# Patient Record
Sex: FEMALE | Race: WHITE | NOT HISPANIC OR LATINO | URBAN - NONMETROPOLITAN AREA
[De-identification: names, ages, dates, MRNs, and addresses within clinical notes are randomized per-mention and may not be internally consistent; named-entity substitution may affect disease eponyms.]

---

## 2020-07-21 ENCOUNTER — OFFICE VISIT (OUTPATIENT)
Dept: URBAN - NONMETROPOLITAN AREA CLINIC 2 | Facility: CLINIC | Age: 60
End: 2020-07-21
Payer: MEDICARE

## 2020-07-21 ENCOUNTER — WEB ENCOUNTER (OUTPATIENT)
Dept: URBAN - NONMETROPOLITAN AREA CLINIC 2 | Facility: CLINIC | Age: 60
End: 2020-07-21

## 2020-07-21 DIAGNOSIS — R14.0 BLOATING/GAS PAIN: ICD-10-CM

## 2020-07-21 DIAGNOSIS — K55.1 SUPERIOR MESENTERIC ARTERY SYNDROME: ICD-10-CM

## 2020-07-21 DIAGNOSIS — K59.09 COLONIC CONSTIPATION: ICD-10-CM

## 2020-07-21 PROCEDURE — 3017F COLORECTAL CA SCREEN DOC REV: CPT | Performed by: NURSE PRACTITIONER

## 2020-07-21 PROCEDURE — 99213 OFFICE O/P EST LOW 20 MIN: CPT | Performed by: NURSE PRACTITIONER

## 2020-07-21 PROCEDURE — G8427 DOCREV CUR MEDS BY ELIG CLIN: HCPCS | Performed by: NURSE PRACTITIONER

## 2020-07-21 PROCEDURE — 1036F TOBACCO NON-USER: CPT | Performed by: NURSE PRACTITIONER

## 2020-07-21 RX ORDER — BUTALBITAL, ACETAMINOPHEN, AND CAFFEINE 50; 300; 40 MG/1; MG/1; MG/1
CAPSULE ORAL
Qty: 0 | Refills: 0 | Status: ACTIVE | COMMUNITY
Start: 1900-01-01

## 2020-07-21 RX ORDER — PANTOPRAZOLE SODIUM 40 MG/1
TAKE 1 TABLET (40 MG) BY ORAL ROUTE ONCE DAILY TABLET, DELAYED RELEASE ORAL 1
Qty: 90 | Refills: 3 | Status: ACTIVE | COMMUNITY
Start: 2020-02-11 | End: 2021-02-05

## 2020-07-21 NOTE — HPI-TODAY'S VISIT:
7/21/2020 Ms. Lake presents for follow up of history of SMA syndrome, abdominal pain and constipation. Since her last visit she did see vascular surgery with normal doppler. She continues to have intermittent left chest wall pain and LUQ abdominal pain assocaited with eating and a BM. She is managing this with diet. Protonix and IG gilbert caused significant GI distress with pain and bloating. She does not eat gluten or dairy, she only eats vegetable and legumes. She is not taking anything for her bowels. She was told 3 years ago at [facility] that her GI tract would likely need surgical intervention with in three years due to poor blood flow from her multiple surgeries and that she may require a small bowel transplant. She has not had an EGD/Colonoscopy since. She does not want to pursue this with COVID. She is thinking of trying the hydrotherapy with IV hydration. MB   2/11/2020 Ms. Lake presents for evalution of left sided chest wall pain and substernal pain with nausea. The symptoms began acutely last month and have been progressive. Her only relief is positional with laying on her left side on a pillow. The pain may improve with eating but she develops subsequent nausea. She denies any SOB or acute chest pain with any radiation. She denies reflux or heartburn. She went to an urgent care yesterday s/p steroid injection with possible costochondritis with no significant change in her pain. She is taking NSAIDs with no relief. She follows with [place] and [facility] with history of SMA syndrome s/p transpotision surgery. She follows with Dr. Neves locally. MB   11/26/2019 Ms. Lake present today for f/u of her SMA syndrome with gas, bloating, and consitpation.  She is actually doing well today.  She has been doing PT 3 times a week.  Her Graves disease is under control, and this is the best that she has felt in years.  She has not taken dulcolax or pedialax since our last visit.  She has a small bowel movement every day and a good bowel movement about every 3 days.  She has not done well with Xifaxan in the past.  She did not think that the IB guard helped either.  She has her system fairly stable at this point and she is doing well.  9/18/2018 The patient is a 58-year-old female with a long, complicated history.  She was diagnosed with SMA syndrome.  She went through a very extensive workup and has had multiple surgeries.  She initially had 1 of the first SMA transposition surgeries done at [place].  Unfortunately, we do not have any of these records today.  Her main issue today is that she has been having problems with bloating, gas, and constipation.  She does not take any medications.  She does not take any pain medications.  She has very limited in her diet.  She has tried eliminating certain foods from her diet.  She has tried probiotics.  She has been evaluated at [place] and at [facility] as well.  Her last colonoscopy was done at [facility] and she was found to have a right colon polyp.  She had a very tortuous colon at that time.  On her EGD, she was described to have an abnormal shape to the stomach, with shelf looking like it might be on top of the diaphragm or the liver.  The stomach appeared to be in the chest a bit.  Everything on the mucosa appeared normal.  she continues to have daily abdominal pain with bloating.  She also has alternating constipation with diarrhea.

## 2020-09-22 ENCOUNTER — OFFICE VISIT (OUTPATIENT)
Dept: URBAN - NONMETROPOLITAN AREA CLINIC 2 | Facility: CLINIC | Age: 60
End: 2020-09-22

## 2020-09-29 ENCOUNTER — TELEPHONE ENCOUNTER (OUTPATIENT)
Dept: URBAN - NONMETROPOLITAN AREA CLINIC 2 | Facility: CLINIC | Age: 60
End: 2020-09-29

## 2020-10-27 ENCOUNTER — LAB OUTSIDE AN ENCOUNTER (OUTPATIENT)
Dept: URBAN - NONMETROPOLITAN AREA CLINIC 2 | Facility: CLINIC | Age: 60
End: 2020-10-27

## 2020-10-27 ENCOUNTER — WEB ENCOUNTER (OUTPATIENT)
Dept: URBAN - NONMETROPOLITAN AREA CLINIC 2 | Facility: CLINIC | Age: 60
End: 2020-10-27

## 2020-10-27 ENCOUNTER — OFFICE VISIT (OUTPATIENT)
Dept: URBAN - NONMETROPOLITAN AREA CLINIC 2 | Facility: CLINIC | Age: 60
End: 2020-10-27
Payer: MEDICARE

## 2020-10-27 VITALS
SYSTOLIC BLOOD PRESSURE: 149 MMHG | HEART RATE: 67 BPM | HEIGHT: 61 IN | WEIGHT: 97 LBS | DIASTOLIC BLOOD PRESSURE: 93 MMHG | TEMPERATURE: 97.8 F | BODY MASS INDEX: 18.31 KG/M2

## 2020-10-27 DIAGNOSIS — R14.0 BLOATING/GAS PAIN: ICD-10-CM

## 2020-10-27 DIAGNOSIS — K55.1 SUPERIOR MESENTERIC ARTERY SYNDROME: ICD-10-CM

## 2020-10-27 DIAGNOSIS — K59.09 COLONIC CONSTIPATION: ICD-10-CM

## 2020-10-27 PROCEDURE — 1036F TOBACCO NON-USER: CPT | Performed by: NURSE PRACTITIONER

## 2020-10-27 PROCEDURE — 99214 OFFICE O/P EST MOD 30 MIN: CPT | Performed by: NURSE PRACTITIONER

## 2020-10-27 PROCEDURE — G8420 CALC BMI NORM PARAMETERS: HCPCS | Performed by: NURSE PRACTITIONER

## 2020-10-27 PROCEDURE — G8427 DOCREV CUR MEDS BY ELIG CLIN: HCPCS | Performed by: NURSE PRACTITIONER

## 2020-10-27 RX ORDER — PANTOPRAZOLE SODIUM 40 MG/1
TAKE 1 TABLET (40 MG) BY ORAL ROUTE ONCE DAILY TABLET, DELAYED RELEASE ORAL 1
Qty: 90 | Refills: 3 | Status: ACTIVE | COMMUNITY
Start: 2020-02-11 | End: 2021-02-05

## 2020-10-27 RX ORDER — BUTALBITAL, ACETAMINOPHEN, AND CAFFEINE 50; 300; 40 MG/1; MG/1; MG/1
CAPSULE ORAL
Qty: 0 | Refills: 0 | Status: ACTIVE | COMMUNITY
Start: 1900-01-01

## 2020-10-27 NOTE — HPI-TODAY'S VISIT:
9/18/2018 The patient is a 58-year-old female with a long, complicated history.  She was diagnosed with SMA syndrome.  She went through a very extensive workup and has had multiple surgeries.  She initially had 1 of the first SMA transposition surgeries done at [place].  Unfortunately, we do not have any of these records today.  Her main issue today is that she has been having problems with bloating, gas, and constipation.  She does not take any medications.  She does not take any pain medications.  She has very limited in her diet.  She has tried eliminating certain foods from her diet.  She has tried probiotics.  She has been evaluated at [place] and at [facility] as well.  Her last colonoscopy was done at [facility] and she was found to have a right colon polyp.  She had a very tortuous colon at that time.  On her EGD, she was described to have an abnormal shape to the stomach, with shelf looking like it might be on top of the diaphragm or the liver.  The stomach appeared to be in the chest a bit.  Everything on the mucosa appeared normal.  she continues to have daily abdominal pain with bloating.  She also has alternating constipation with diarrhea.  11/26/2019 Ms. Lake present today for f/u of her SMA syndrome with gas, bloating, and consitpation.  She is actually doing well today.  She has been doing PT 3 times a week.  Her Graves disease is under control, and this is the best that she has felt in years.  She has not taken dulcolax or pedialax since our last visit.  She has a small bowel movement every day and a good bowel movement about every 3 days.  She has not done well with Xifaxan in the past.  She did not think that the IB guard helped either.  She has her system fairly stable at this point and she is doing well. 2/11/2020 Ms. Lake presents for evalution of left sided chest wall pain and substernal pain with nausea. The symptoms began acutely last month and have been progressive. Her only relief is positional with laying on her left side on a pillow. The pain may improve with eating but she develops subsequent nausea. She denies any SOB or acute chest pain with any radiation. She denies reflux or heartburn. She went to an urgent care yesterday s/p steroid injection with possible costochondritis with no significant change in her pain. She is taking NSAIDs with no relief. She follows with [place] and [facility] with history of SMA syndrome s/p transpotision surgery. She follows with Dr. Neves locally. MB 7/21/2020 Ms. Lake presents for follow up of history of SMA syndrome, abdominal pain and constipation. Since her last visit she did see vascular surgery with normal doppler. She continues to have intermittent left chest wall pain and LUQ abdominal pain assocaited with eating and a BM. She is managing this with diet. Protonix and IG gilbert caused significant GI distress with pain and bloating. She does not eat gluten or dairy, she only eats vegetable and legumes. She is not taking anything for her bowels. She was told 3 years ago at [facility] that her GI tract would likely need surgical intervention with in three years due to poor blood flow from her multiple surgeries and that she may require a small bowel transplant. She has not had an EGD/Colonoscopy since. She does not want to pursue this with COVID. She is thinking of trying the hydrotherapy with IV hydration. MB 10/27/2020 Geri presents for follow-up of SMA syndrome, abdominal pain, and weight loss.  Since her last visit she did undergo evaluation with vascular surgery at [place].  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  She continues to struggle with postprandial abdominal pain and weight loss.  She got down to 92 pounds but is back up to 97 with a more liquid diet.  She is eating a high calorie low residue diet.  Her vascular surgery team is considering a repeat surgery given her vascular changes but she does need a repeat upper endoscopy to evaluate her duodenum and her anatomy.  She continues to struggle with her symptoms but does feel like she has figured out what she can eat.  Today she is ready to proceed with repeat EGD.  MB

## 2020-10-28 ENCOUNTER — CLAIMS CREATED FROM THE CLAIM WINDOW (OUTPATIENT)
Dept: URBAN - METROPOLITAN AREA CLINIC 4 | Facility: CLINIC | Age: 60
End: 2020-10-28
Payer: MEDICARE

## 2020-10-28 ENCOUNTER — OFFICE VISIT (OUTPATIENT)
Dept: URBAN - NONMETROPOLITAN AREA SURGERY CENTER 1 | Facility: SURGERY CENTER | Age: 60
End: 2020-10-28
Payer: MEDICARE

## 2020-10-28 DIAGNOSIS — K31.89 OTHER DISEASES OF STOMACH AND DUODENUM: ICD-10-CM

## 2020-10-28 DIAGNOSIS — K31.89 ACQUIRED DEFORMITY OF DUODENUM: ICD-10-CM

## 2020-10-28 DIAGNOSIS — K21.9 ACID REFLUX: ICD-10-CM

## 2020-10-28 DIAGNOSIS — K29.60 OTHER GASTRITIS WITHOUT BLEEDING: ICD-10-CM

## 2020-10-28 DIAGNOSIS — K21.0 GASTRO-ESOPHAGEAL REFLUX DISEASE WITH ESOPHAGITIS: ICD-10-CM

## 2020-10-28 PROCEDURE — 88305 TISSUE EXAM BY PATHOLOGIST: CPT | Performed by: PATHOLOGY

## 2020-10-28 PROCEDURE — 88312 SPECIAL STAINS GROUP 1: CPT | Performed by: PATHOLOGY

## 2020-10-28 PROCEDURE — 88342 IMHCHEM/IMCYTCHM 1ST ANTB: CPT | Performed by: PATHOLOGY

## 2020-10-28 PROCEDURE — G8907 PT DOC NO EVENTS ON DISCHARG: HCPCS | Performed by: INTERNAL MEDICINE

## 2020-10-28 PROCEDURE — 43239 EGD BIOPSY SINGLE/MULTIPLE: CPT | Performed by: INTERNAL MEDICINE

## 2020-11-17 ENCOUNTER — OFFICE VISIT (OUTPATIENT)
Dept: URBAN - NONMETROPOLITAN AREA CLINIC 2 | Facility: CLINIC | Age: 60
End: 2020-11-17
Payer: MEDICARE

## 2020-11-17 ENCOUNTER — LAB OUTSIDE AN ENCOUNTER (OUTPATIENT)
Dept: URBAN - NONMETROPOLITAN AREA CLINIC 2 | Facility: CLINIC | Age: 60
End: 2020-11-17

## 2020-11-17 VITALS
HEIGHT: 61 IN | DIASTOLIC BLOOD PRESSURE: 74 MMHG | TEMPERATURE: 97.5 F | SYSTOLIC BLOOD PRESSURE: 120 MMHG | BODY MASS INDEX: 17.94 KG/M2 | HEART RATE: 69 BPM | WEIGHT: 95 LBS

## 2020-11-17 DIAGNOSIS — K59.09 COLONIC CONSTIPATION: ICD-10-CM

## 2020-11-17 DIAGNOSIS — K55.1 SUPERIOR MESENTERIC ARTERY SYNDROME: ICD-10-CM

## 2020-11-17 DIAGNOSIS — R14.0 BLOATING/GAS PAIN: ICD-10-CM

## 2020-11-17 PROCEDURE — 1036F TOBACCO NON-USER: CPT | Performed by: NURSE PRACTITIONER

## 2020-11-17 PROCEDURE — 99213 OFFICE O/P EST LOW 20 MIN: CPT | Performed by: NURSE PRACTITIONER

## 2020-11-17 PROCEDURE — 3017F COLORECTAL CA SCREEN DOC REV: CPT | Performed by: NURSE PRACTITIONER

## 2020-11-17 PROCEDURE — G8427 DOCREV CUR MEDS BY ELIG CLIN: HCPCS | Performed by: NURSE PRACTITIONER

## 2020-11-17 RX ORDER — BUTALBITAL, ACETAMINOPHEN, AND CAFFEINE 50; 300; 40 MG/1; MG/1; MG/1
CAPSULE ORAL
Qty: 0 | Refills: 0 | Status: ACTIVE | COMMUNITY
Start: 1900-01-01

## 2020-11-17 RX ORDER — PANTOPRAZOLE SODIUM 40 MG/1
TAKE 1 TABLET (40 MG) BY ORAL ROUTE ONCE DAILY TABLET, DELAYED RELEASE ORAL 1
Qty: 90 | Refills: 3 | Status: ACTIVE | COMMUNITY
Start: 2020-02-11 | End: 2021-02-05

## 2020-11-17 NOTE — HPI-TODAY'S VISIT:
9/18/2018 The patient is a 58-year-old female with a long, complicated history.  She was diagnosed with SMA syndrome.  She went through a very extensive workup and has had multiple surgeries.  She initially had 1 of the first SMA transposition surgeries done at [place].  Unfortunately, we do not have any of these records today.  Her main issue today is that she has been having problems with bloating, gas, and constipation.  She does not take any medications.  She does not take any pain medications.  She has very limited in her diet.  She has tried eliminating certain foods from her diet.  She has tried probiotics.  She has been evaluated at [place] and at [facility] as well.  Her last colonoscopy was done at [facility] and she was found to have a right colon polyp.  She had a very tortuous colon at that time.  On her EGD, she was described to have an abnormal shape to the stomach, with shelf looking like it might be on top of the diaphragm or the liver.  The stomach appeared to be in the chest a bit.  Everything on the mucosa appeared normal.  she continues to have daily abdominal pain with bloating.  She also has alternating constipation with diarrhea.  11/26/2019 Ms. Lake present today for f/u of her SMA syndrome with gas, bloating, and consitpation.  She is actually doing well today.  She has been doing PT 3 times a week.  Her Graves disease is under control, and this is the best that she has felt in years.  She has not taken dulcolax or pedialax since our last visit.  She has a small bowel movement every day and a good bowel movement about every 3 days.  She has not done well with Xifaxan in the past.  She did not think that the IB guard helped either.  She has her system fairly stable at this point and she is doing well. 2/11/2020 Ms. Lake presents for evalution of left sided chest wall pain and substernal pain with nausea. The symptoms began acutely last month and have been progressive. Her only relief is positional with laying on her left side on a pillow. The pain may improve with eating but she develops subsequent nausea. She denies any SOB or acute chest pain with any radiation. She denies reflux or heartburn. She went to an urgent care yesterday s/p steroid injection with possible costochondritis with no significant change in her pain. She is taking NSAIDs with no relief. She follows with [place] and [facility] with history of SMA syndrome s/p transpotision surgery. She follows with Dr. Neves locally. MB 7/21/2020 Ms. Lake presents for follow up of history of SMA syndrome, abdominal pain and constipation. Since her last visit she did see vascular surgery with normal doppler. She continues to have intermittent left chest wall pain and LUQ abdominal pain assocaited with eating and a BM. She is managing this with diet. Protonix and IG gilbert caused significant GI distress with pain and bloating. She does not eat gluten or dairy, she only eats vegetable and legumes. She is not taking anything for her bowels. She was told 3 years ago at [facility] that her GI tract would likely need surgical intervention with in three years due to poor blood flow from her multiple surgeries and that she may require a small bowel transplant. She has not had an EGD/Colonoscopy since. She does not want to pursue this with COVID. She is thinking of trying the hydrotherapy with IV hydration. MB 10/27/2020 Geri presents for follow-up of SMA syndrome, abdominal pain, and weight loss.  Since her last visit she did undergo evaluation with vascular surgery at [place].  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  She continues to struggle with postprandial abdominal pain and weight loss.  She got down to 92 pounds but is back up to 97 with a more liquid diet.  She is eating a high calorie low residue diet.  Her vascular surgery team is considering a repeat surgery given her vascular changes but she does need a repeat upper endoscopy to evaluate her duodenum and her anatomy.  She continues to struggle with her symptoms but does feel like she has figured out what she can eat.  Today she is ready to proceed with repeat EGD.  MB  11/17/2020 Geri presents for endoscopy follow-up. Dr. Garcia was able to get to the second portion of her duodenum with no evidence of ischemia on the mucosa. She took multiple biopsies in the second portion along with gastric and esophageal biopsies. She has no evidence of ischemia throughout her upper GI tract. She continues to struggle on and off with abdominal pain. Recently it has been more in her right lower quadrant. Her constipation has been fairly well controlled nutritionally. She is not taking anything regularly for this. She does agree that gastropathy plays a role in her symptoms. She would like to have a colonoscopy prior to sitting back down with Dr. Shaw and the vascular team at [place] early in 2021. Today we had a discussion regarding risks and benefits and she would like to pursue colonoscopy. Her last was in May 2017 done at [facility] with a right colon polyp near the cecum along with a tortuous colon requiring a pediatric scope.  We do not have the pathology report from this.  Today she is doing fairly well otherwise but would like to proceed with repeat colonoscopy for further evaluation of her discomfort.  MB

## 2020-12-10 ENCOUNTER — OFFICE VISIT (OUTPATIENT)
Dept: URBAN - METROPOLITAN AREA MEDICAL CENTER 1 | Facility: MEDICAL CENTER | Age: 60
End: 2020-12-10
Payer: MEDICARE

## 2020-12-10 DIAGNOSIS — R93.3 ABN FINDINGS-GI TRACT: ICD-10-CM

## 2020-12-10 DIAGNOSIS — R10.84 ABDOMINAL CRAMPING, GENERALIZED: ICD-10-CM

## 2020-12-10 PROCEDURE — G9937 DIG OR SURV COLSCO: HCPCS | Performed by: INTERNAL MEDICINE

## 2020-12-10 PROCEDURE — 45380 COLONOSCOPY AND BIOPSY: CPT | Performed by: INTERNAL MEDICINE

## 2021-05-17 ENCOUNTER — OFFICE VISIT (OUTPATIENT)
Dept: URBAN - NONMETROPOLITAN AREA CLINIC 13 | Facility: CLINIC | Age: 61
End: 2021-05-17
Payer: MEDICARE

## 2021-05-17 VITALS
HEIGHT: 61 IN | DIASTOLIC BLOOD PRESSURE: 81 MMHG | SYSTOLIC BLOOD PRESSURE: 119 MMHG | TEMPERATURE: 96.9 F | HEART RATE: 71 BPM | BODY MASS INDEX: 18.5 KG/M2 | WEIGHT: 98 LBS

## 2021-05-17 DIAGNOSIS — R10.9 LEFT FLANK PAIN: ICD-10-CM

## 2021-05-17 DIAGNOSIS — K59.09 COLONIC CONSTIPATION: ICD-10-CM

## 2021-05-17 DIAGNOSIS — K55.1 SUPERIOR MESENTERIC ARTERY SYNDROME: ICD-10-CM

## 2021-05-17 DIAGNOSIS — R14.0 BLOATING/GAS PAIN: ICD-10-CM

## 2021-05-17 PROCEDURE — 99214 OFFICE O/P EST MOD 30 MIN: CPT | Performed by: INTERNAL MEDICINE

## 2021-05-17 RX ORDER — BUTALBITAL, ACETAMINOPHEN, AND CAFFEINE 50; 300; 40 MG/1; MG/1; MG/1
CAPSULE ORAL
Qty: 0 | Refills: 0 | Status: ACTIVE | COMMUNITY
Start: 1900-01-01

## 2021-05-17 NOTE — HPI-TODAY'S VISIT:
9/18/2018 The patient is a 58-year-old female with a long, complicated history.  She was diagnosed with SMA syndrome.  She went through a very extensive workup and has had multiple surgeries.  She initially had 1 of the first SMA transposition surgeries done at [place].  Unfortunately, we do not have any of these records today.  Her main issue today is that she has been having problems with bloating, gas, and constipation.  She does not take any medications.  She does not take any pain medications.  She has very limited in her diet.  She has tried eliminating certain foods from her diet.  She has tried probiotics.  She has been evaluated at [place] and at [facility] as well.  Her last colonoscopy was done at [facility] and she was found to have a right colon polyp.  She had a very tortuous colon at that time.  On her EGD, she was described to have an abnormal shape to the stomach, with shelf looking like it might be on top of the diaphragm or the liver.  The stomach appeared to be in the chest a bit.  Everything on the mucosa appeared normal.  she continues to have daily abdominal pain with bloating.  She also has alternating constipation with diarrhea.  11/26/2019 Ms. Lake present today for f/u of her SMA syndrome with gas, bloating, and consitpation.  She is actually doing well today.  She has been doing PT 3 times a week.  Her Graves disease is under control, and this is the best that she has felt in years.  She has not taken dulcolax or pedialax since our last visit.  She has a small bowel movement every day and a good bowel movement about every 3 days.  She has not done well with Xifaxan in the past.  She did not think that the IB guard helped either.  She has her system fairly stable at this point and she is doing well. 2/11/2020 Ms. Lake presents for evalution of left sided chest wall pain and substernal pain with nausea. The symptoms began acutely last month and have been progressive. Her only relief is positional with laying on her left side on a pillow. The pain may improve with eating but she develops subsequent nausea. She denies any SOB or acute chest pain with any radiation. She denies reflux or heartburn. She went to an urgent care yesterday s/p steroid injection with possible costochondritis with no significant change in her pain. She is taking NSAIDs with no relief. She follows with [place] and [facility] with history of SMA syndrome s/p transpotision surgery. She follows with Dr. Neves locally. MB 7/21/2020 Ms. Lake presents for follow up of history of SMA syndrome, abdominal pain and constipation. Since her last visit she did see vascular surgery with normal doppler. She continues to have intermittent left chest wall pain and LUQ abdominal pain assocaited with eating and a BM. She is managing this with diet. Protonix and IG gilbert caused significant GI distress with pain and bloating. She does not eat gluten or dairy, she only eats vegetable and legumes. She is not taking anything for her bowels. She was told 3 years ago at [facility] that her GI tract would likely need surgical intervention with in three years due to poor blood flow from her multiple surgeries and that she may require a small bowel transplant. She has not had an EGD/Colonoscopy since. She does not want to pursue this with COVID. She is thinking of trying the hydrotherapy with IV hydration. MB 10/27/2020 Geri presents for follow-up of SMA syndrome, abdominal pain, and weight loss.  Since her last visit she did undergo evaluation with vascular surgery at [place].  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  She continues to struggle with postprandial abdominal pain and weight loss.  She got down to 92 pounds but is back up to 97 with a more liquid diet.  She is eating a high calorie low residue diet.  Her vascular surgery team is considering a repeat surgery given her vascular changes but she does need a repeat upper endoscopy to evaluate her duodenum and her anatomy.  She continues to struggle with her symptoms but does feel like she has figured out what she can eat.  Today she is ready to proceed with repeat EGD.  MB  11/17/2020 Geri presents for endoscopy follow-up. Dr. Garcia was able to get to the second portion of her duodenum with no evidence of ischemia on the mucosa. She took multiple biopsies in the second portion along with gastric and esophageal biopsies. She has no evidence of ischemia throughout her upper GI tract. She continues to struggle on and off with abdominal pain. Recently it has been more in her right lower quadrant. Her constipation has been fairly well controlled nutritionally. She is not taking anything regularly for this. She does agree that gastropathy plays a role in her symptoms. She would like to have a colonoscopy prior to sitting back down with Dr. Shaw and the vascular team at [place] early in 2021. Today we had a discussion regarding risks and benefits and she would like to pursue colonoscopy. Her last was in May 2017 done at [facility] with a right colon polyp near the cecum along with a tortuous colon requiring a pediatric scope.  We do not have the pathology report from this.  Today she is doing fairly well otherwise but would like to proceed with repeat colonoscopy for further evaluation of her discomfort.  MB  5/17/2021 Mrs. Lake is presents for follow-up of history of SMA syndrome with a complex history as described above.  Since her last visit she underwent colonoscopy in November 2020 that was normal.  She has been doing well on no medications for her GI tract however 3 weeks ago she developed left flank pain.  She has tried ibuprofen 3 times for the discomfort with some improvement.  This has given her some mild heartburn.  She has a bowel movement on a daily basis by eating beans.  She does feel like she evacuates normally.  The pain is severe, and radiates up and down her entire body.  She called Dr. Hammonds her vascular surgeon at [place] who suggested she contact her PCP and her gastroenterology team.  She has a complicated history with medications and does not tolerate most any prescription medication.  Her colonoscopy was normal in December as of described above.  Prior to that she had an EGD in October which shows mild esophagitis, and mild gastritis.  She does agree to try a low-dose of Nexium over-the-counter branded as this has helped in the past.  She does agree to try a low-dose of MiraLAX to see if we can improve evacuation.  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  She has not seen a urologist for this, but is interested in with meeting one at [place].  Today she has multiple complaints and an extremely complicated history.  MB

## 2021-08-27 ENCOUNTER — WEB ENCOUNTER (OUTPATIENT)
Dept: URBAN - NONMETROPOLITAN AREA CLINIC 13 | Facility: CLINIC | Age: 61
End: 2021-08-27

## 2021-08-27 ENCOUNTER — OFFICE VISIT (OUTPATIENT)
Dept: URBAN - NONMETROPOLITAN AREA CLINIC 13 | Facility: CLINIC | Age: 61
End: 2021-08-27
Payer: MEDICARE

## 2021-08-27 VITALS
SYSTOLIC BLOOD PRESSURE: 136 MMHG | HEART RATE: 72 BPM | DIASTOLIC BLOOD PRESSURE: 76 MMHG | WEIGHT: 104 LBS | HEIGHT: 61 IN | BODY MASS INDEX: 19.63 KG/M2

## 2021-08-27 DIAGNOSIS — R14.0 BLOATING/GAS PAIN: ICD-10-CM

## 2021-08-27 DIAGNOSIS — R10.9 LEFT FLANK PAIN: ICD-10-CM

## 2021-08-27 DIAGNOSIS — K55.1 SUPERIOR MESENTERIC ARTERY SYNDROME: ICD-10-CM

## 2021-08-27 DIAGNOSIS — K59.09 COLONIC CONSTIPATION: ICD-10-CM

## 2021-08-27 PROCEDURE — 99214 OFFICE O/P EST MOD 30 MIN: CPT | Performed by: INTERNAL MEDICINE

## 2021-08-27 RX ORDER — HYOSCYAMINE SULFATE 0.12 MG/1
1 TABLET UNDER THE TONGUE AND ALLOW TO DISSOLVE  AS NEEDED FOR DIARRHEA AND GAS PAINS TABLET SUBLINGUAL THREE TIMES A DAY
Qty: 60 TABLET | Refills: 6 | OUTPATIENT

## 2021-08-27 RX ORDER — BUTALBITAL, ACETAMINOPHEN, AND CAFFEINE 50; 300; 40 MG/1; MG/1; MG/1
CAPSULE ORAL
Qty: 0 | Refills: 0 | Status: ACTIVE | COMMUNITY
Start: 1900-01-01

## 2021-08-27 NOTE — HPI-TODAY'S VISIT:
9/18/2018 The patient is a 58-year-old female with a long, complicated history.  She was diagnosed with SMA syndrome.  She went through a very extensive workup and has had multiple surgeries.  She initially had 1 of the first SMA transposition surgeries done at [place].  Unfortunately, we do not have any of these records today.  Her main issue today is that she has been having problems with bloating, gas, and constipation.  She does not take any medications.  She does not take any pain medications.  She has very limited in her diet.  She has tried eliminating certain foods from her diet.  She has tried probiotics.  She has been evaluated at [place] and at [facility] as well.  Her last colonoscopy was done at [facility] and she was found to have a right colon polyp.  She had a very tortuous colon at that time.  On her EGD, she was described to have an abnormal shape to the stomach, with shelf looking like it might be on top of the diaphragm or the liver.  The stomach appeared to be in the chest a bit.  Everything on the mucosa appeared normal.  she continues to have daily abdominal pain with bloating.  She also has alternating constipation with diarrhea.  11/26/2019 Ms. Lake present today for f/u of her SMA syndrome with gas, bloating, and consitpation.  She is actually doing well today.  She has been doing PT 3 times a week.  Her Graves disease is under control, and this is the best that she has felt in years.  She has not taken dulcolax or pedialax since our last visit.  She has a small bowel movement every day and a good bowel movement about every 3 days.  She has not done well with Xifaxan in the past.  She did not think that the IB guard helped either.  She has her system fairly stable at this point and she is doing well. 2/11/2020 Ms. Lake presents for evalution of left sided chest wall pain and substernal pain with nausea. The symptoms began acutely last month and have been progressive. Her only relief is positional with laying on her left side on a pillow. The pain may improve with eating but she develops subsequent nausea. She denies any SOB or acute chest pain with any radiation. She denies reflux or heartburn. She went to an urgent care yesterday s/p steroid injection with possible costochondritis with no significant change in her pain. She is taking NSAIDs with no relief. She follows with [place] and [facility] with history of SMA syndrome s/p transpotision surgery. She follows with Dr. Neves locally. MB 7/21/2020 Ms. Lake presents for follow up of history of SMA syndrome, abdominal pain and constipation. Since her last visit she did see vascular surgery with normal doppler. She continues to have intermittent left chest wall pain and LUQ abdominal pain assocaited with eating and a BM. She is managing this with diet. Protonix and IG gilbert caused significant GI distress with pain and bloating. She does not eat gluten or dairy, she only eats vegetable and legumes. She is not taking anything for her bowels. She was told 3 years ago at [facility] that her GI tract would likely need surgical intervention with in three years due to poor blood flow from her multiple surgeries and that she may require a small bowel transplant. She has not had an EGD/Colonoscopy since. She does not want to pursue this with COVID. She is thinking of trying the hydrotherapy with IV hydration. MB 10/27/2020 Geri presents for follow-up of SMA syndrome, abdominal pain, and weight loss.  Since her last visit she did undergo evaluation with vascular surgery at [place].  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  She continues to struggle with postprandial abdominal pain and weight loss.  She got down to 92 pounds but is back up to 97 with a more liquid diet.  She is eating a high calorie low residue diet.  Her vascular surgery team is considering a repeat surgery given her vascular changes but she does need a repeat upper endoscopy to evaluate her duodenum and her anatomy.  She continues to struggle with her symptoms but does feel like she has figured out what she can eat.  Today she is ready to proceed with repeat EGD.  MB  11/17/2020 Geri presents for endoscopy follow-up. Dr. Garcia was able to get to the second portion of her duodenum with no evidence of ischemia on the mucosa. She took multiple biopsies in the second portion along with gastric and esophageal biopsies. She has no evidence of ischemia throughout her upper GI tract. She continues to struggle on and off with abdominal pain. Recently it has been more in her right lower quadrant. Her constipation has been fairly well controlled nutritionally. She is not taking anything regularly for this. She does agree that gastropathy plays a role in her symptoms. She would like to have a colonoscopy prior to sitting back down with Dr. Shaw and the vascular team at [place] early in 2021. Today we had a discussion regarding risks and benefits and she would like to pursue colonoscopy. Her last was in May 2017 done at [facility] with a right colon polyp near the cecum along with a tortuous colon requiring a pediatric scope.  We do not have the pathology report from this.  Today she is doing fairly well otherwise but would like to proceed with repeat colonoscopy for further evaluation of her discomfort.  MB  5/17/2021 Mrs. Lake is presents for follow-up of history of SMA syndrome with a complex history as described above.  Since her last visit she underwent colonoscopy in November 2020 that was normal.  She has been doing well on no medications for her GI tract however 3 weeks ago she developed left flank pain.  She has tried ibuprofen 3 times for the discomfort with some improvement.  This has given her some mild heartburn.  She has a bowel movement on a daily basis by eating beans.  She does feel like she evacuates normally.  The pain is severe, and radiates up and down her entire body.  She called Dr. Hammonds her vascular surgeon at [place] who suggested she contact her PCP and her gastroenterology team.  She has a complicated history with medications and does not tolerate most any prescription medication.  Her colonoscopy was normal in December as of described above.  Prior to that she had an EGD in October which shows mild esophagitis, and mild gastritis.  She does agree to try a low-dose of Nexium over-the-counter branded as this has helped in the past.  She does agree to try a low-dose of MiraLAX to see if we can improve evacuation.  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  She has not seen a urologist for this, but is interested in with meeting one at [place].  Today she has multiple complaints and an extremely complicated history.  MB 8/27/2021 The patient presents today for follow-up of her SMA syndrome, abdominal pain, and likely constipation with IBS.  Since her last visit, she was unable to tolerate MiraLAX.  It does trigger migraines.  Almost every medication she takes for constipation tends to trigger migraines including anything with magnesium.  We have discussed dietary changes as well.  Her main complaint is that she does have pain in her left flank and left upper quadrant.  We have discussed that this is likely secondary to constipation with stool and gas.  She also has issues with occasional small bowel spasms.  She does not have a current prescription of high Cosamin.  She has met with her vascular surgeon.  They are watching her left kidney.  We have discussed her seeing a physician for functional medication to try to find some foods that she can take to prevent gas production but also help with her bowels.  I have recommended Dr. Lissette Person.  Overall, her GI symptoms are stable.  She has gained some weight, and she is able to tolerate food.  Her bowels are moving fairly regularly.

## 2022-02-25 ENCOUNTER — OFFICE VISIT (OUTPATIENT)
Dept: URBAN - NONMETROPOLITAN AREA CLINIC 13 | Facility: CLINIC | Age: 62
End: 2022-02-25
Payer: MEDICARE

## 2022-02-25 VITALS
WEIGHT: 101.2 LBS | SYSTOLIC BLOOD PRESSURE: 130 MMHG | HEIGHT: 61 IN | HEART RATE: 61 BPM | DIASTOLIC BLOOD PRESSURE: 79 MMHG | BODY MASS INDEX: 19.11 KG/M2

## 2022-02-25 DIAGNOSIS — K55.1 SUPERIOR MESENTERIC ARTERY SYNDROME: ICD-10-CM

## 2022-02-25 DIAGNOSIS — R14.0 BLOATING/GAS PAIN: ICD-10-CM

## 2022-02-25 DIAGNOSIS — R10.9 LEFT FLANK PAIN: ICD-10-CM

## 2022-02-25 DIAGNOSIS — K59.09 COLONIC CONSTIPATION: ICD-10-CM

## 2022-02-25 PROCEDURE — 99214 OFFICE O/P EST MOD 30 MIN: CPT | Performed by: INTERNAL MEDICINE

## 2022-02-25 RX ORDER — HYOSCYAMINE SULFATE 0.12 MG/1
1 TABLET UNDER THE TONGUE AND ALLOW TO DISSOLVE  AS NEEDED FOR DIARRHEA AND GAS PAINS TABLET SUBLINGUAL THREE TIMES A DAY
Qty: 60 TABLET | Refills: 6 | Status: ACTIVE | COMMUNITY

## 2022-02-25 RX ORDER — BUTALBITAL, ACETAMINOPHEN, AND CAFFEINE 50; 300; 40 MG/1; MG/1; MG/1
CAPSULE ORAL
Qty: 0 | Refills: 0 | Status: ACTIVE | COMMUNITY
Start: 1900-01-01

## 2022-02-25 NOTE — HPI-TODAY'S VISIT:
9/18/2018 The patient is a 58-year-old female with a long, complicated history.  She was diagnosed with SMA syndrome.  She went through a very extensive workup and has had multiple surgeries.  She initially had 1 of the first SMA transposition surgeries done at [place].  Unfortunately, we do not have any of these records today.  Her main issue today is that she has been having problems with bloating, gas, and constipation.  She does not take any medications.  She does not take any pain medications.  She has very limited in her diet.  She has tried eliminating certain foods from her diet.  She has tried probiotics.  She has been evaluated at [place] and at [facility] as well.  Her last colonoscopy was done at [facility] and she was found to have a right colon polyp.  She had a very tortuous colon at that time.  On her EGD, she was described to have an abnormal shape to the stomach, with shelf looking like it might be on top of the diaphragm or the liver.  The stomach appeared to be in the chest a bit.  Everything on the mucosa appeared normal.  she continues to have daily abdominal pain with bloating.  She also has alternating constipation with diarrhea.  11/26/2019 Ms. Lake present today for f/u of her SMA syndrome with gas, bloating, and consitpation.  She is actually doing well today.  She has been doing PT 3 times a week.  Her Graves disease is under control, and this is the best that she has felt in years.  She has not taken dulcolax or pedialax since our last visit.  She has a small bowel movement every day and a good bowel movement about every 3 days.  She has not done well with Xifaxan in the past.  She did not think that the IB guard helped either.  She has her system fairly stable at this point and she is doing well. 2/11/2020 Ms. Lake presents for evalution of left sided chest wall pain and substernal pain with nausea. The symptoms began acutely last month and have been progressive. Her only relief is positional with laying on her left side on a pillow. The pain may improve with eating but she develops subsequent nausea. She denies any SOB or acute chest pain with any radiation. She denies reflux or heartburn. She went to an urgent care yesterday s/p steroid injection with possible costochondritis with no significant change in her pain. She is taking NSAIDs with no relief. She follows with [place] and [facility] with history of SMA syndrome s/p transpotision surgery. She follows with Dr. Neves locally. MB 7/21/2020 Ms. Lake presents for follow up of history of SMA syndrome, abdominal pain and constipation. Since her last visit she did see vascular surgery with normal doppler. She continues to have intermittent left chest wall pain and LUQ abdominal pain assocaited with eating and a BM. She is managing this with diet. Protonix and IG gilbert caused significant GI distress with pain and bloating. She does not eat gluten or dairy, she only eats vegetable and legumes. She is not taking anything for her bowels. She was told 3 years ago at [facility] that her GI tract would likely need surgical intervention with in three years due to poor blood flow from her multiple surgeries and that she may require a small bowel transplant. She has not had an EGD/Colonoscopy since. She does not want to pursue this with COVID. She is thinking of trying the hydrotherapy with IV hydration. MB 10/27/2020 Geri presents for follow-up of SMA syndrome, abdominal pain, and weight loss.  Since her last visit she did undergo evaluation with vascular surgery at [place].  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  She continues to struggle with postprandial abdominal pain and weight loss.  She got down to 92 pounds but is back up to 97 with a more liquid diet.  She is eating a high calorie low residue diet.  Her vascular surgery team is considering a repeat surgery given her vascular changes but she does need a repeat upper endoscopy to evaluate her duodenum and her anatomy.  She continues to struggle with her symptoms but does feel like she has figured out what she can eat.  Today she is ready to proceed with repeat EGD.  MB  11/17/2020 Geri presents for endoscopy follow-up. Dr. Garcia was able to get to the second portion of her duodenum with no evidence of ischemia on the mucosa. She took multiple biopsies in the second portion along with gastric and esophageal biopsies. She has no evidence of ischemia throughout her upper GI tract. She continues to struggle on and off with abdominal pain. Recently it has been more in her right lower quadrant. Her constipation has been fairly well controlled nutritionally. She is not taking anything regularly for this. She does agree that gastropathy plays a role in her symptoms. She would like to have a colonoscopy prior to sitting back down with Dr. Shaw and the vascular team at [place] early in 2021. Today we had a discussion regarding risks and benefits and she would like to pursue colonoscopy. Her last was in May 2017 done at [facility] with a right colon polyp near the cecum along with a tortuous colon requiring a pediatric scope.  We do not have the pathology report from this.  Today she is doing fairly well otherwise but would like to proceed with repeat colonoscopy for further evaluation of her discomfort.  MB  5/17/2021 Mrs. Lake is presents for follow-up of history of SMA syndrome with a complex history as described above.  Since her last visit she underwent colonoscopy in November 2020 that was normal.  She has been doing well on no medications for her GI tract however 3 weeks ago she developed left flank pain.  She has tried ibuprofen 3 times for the discomfort with some improvement.  This has given her some mild heartburn.  She has a bowel movement on a daily basis by eating beans.  She does feel like she evacuates normally.  The pain is severe, and radiates up and down her entire body.  She called Dr. Hammonds her vascular surgeon at [place] who suggested she contact her PCP and her gastroenterology team.  She has a complicated history with medications and does not tolerate most any prescription medication.  Her colonoscopy was normal in December as of described above.  Prior to that she had an EGD in October which shows mild esophagitis, and mild gastritis.  She does agree to try a low-dose of Nexium over-the-counter branded as this has helped in the past.  She does agree to try a low-dose of MiraLAX to see if we can improve evacuation.  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  She has not seen a urologist for this, but is interested in with meeting one at [place].  Today she has multiple complaints and an extremely complicated history.  MB 8/27/2021 The patient presents today for follow-up of her SMA syndrome, abdominal pain, and likely constipation with IBS.  Since her last visit, she was unable to tolerate MiraLAX.  It does trigger migraines.  Almost every medication she takes for constipation tends to trigger migraines including anything with magnesium.  We have discussed dietary changes as well.  Her main complaint is that she does have pain in her left flank and left upper quadrant.  We have discussed that this is likely secondary to constipation with stool and gas.  She also has issues with occasional small bowel spasms.  She does not have a current prescription of high Cosamin.  She has met with her vascular surgeon.  They are watching her left kidney.  We have discussed her seeing a physician for functional medication to try to find some foods that she can take to prevent gas production but also help with her bowels.  I have recommended Dr. Lissette Person.  Overall, her GI symptoms are stable.  She has gained some weight, and she is able to tolerate food.  Her bowels are moving fairly regularly. 2/25/2022 The patient presents today for follow-up of her SMA syndrome, abdominal pain, weight loss, and constipation.  Since her last visit, she is actually been doing quite well.  She has followed up with [place], and they do want to proceed with possible stenting of her SMA.  Her velocities have increased somewhat, and they do feel that they can help her with stenting at this point.  She is actually feeling quite well.  She is gaining weight.  Her bowels are moving fairly well.  She is able to eat some more protein along with her regular diet.  We have had a long discussion, and I do think proceeding with vascular surgery work-up is likely warranted at this time.  She is going to follow back up with us in 6 months.

## 2022-08-19 ENCOUNTER — OFFICE VISIT (OUTPATIENT)
Dept: URBAN - NONMETROPOLITAN AREA CLINIC 2 | Facility: CLINIC | Age: 62
End: 2022-08-19
Payer: MEDICARE

## 2022-08-19 VITALS
DIASTOLIC BLOOD PRESSURE: 72 MMHG | HEIGHT: 61 IN | HEART RATE: 73 BPM | BODY MASS INDEX: 19.45 KG/M2 | SYSTOLIC BLOOD PRESSURE: 117 MMHG | WEIGHT: 103 LBS | TEMPERATURE: 97.2 F

## 2022-08-19 DIAGNOSIS — K55.1 SUPERIOR MESENTERIC ARTERY SYNDROME: ICD-10-CM

## 2022-08-19 DIAGNOSIS — R10.9 LEFT FLANK PAIN: ICD-10-CM

## 2022-08-19 DIAGNOSIS — Z12.11 SCREENING FOR COLON CANCER: ICD-10-CM

## 2022-08-19 DIAGNOSIS — K59.09 COLONIC CONSTIPATION: ICD-10-CM

## 2022-08-19 DIAGNOSIS — R14.0 BLOATING/GAS PAIN: ICD-10-CM

## 2022-08-19 PROBLEM — 305058001: Status: ACTIVE | Noted: 2022-08-19

## 2022-08-19 PROCEDURE — 99214 OFFICE O/P EST MOD 30 MIN: CPT | Performed by: INTERNAL MEDICINE

## 2022-08-19 RX ORDER — BUTALBITAL, ACETAMINOPHEN, AND CAFFEINE 50; 300; 40 MG/1; MG/1; MG/1
CAPSULE ORAL
Qty: 0 | Refills: 0 | Status: ACTIVE | COMMUNITY
Start: 1900-01-01

## 2022-08-19 RX ORDER — HYOSCYAMINE SULFATE 0.12 MG/1
1 TABLET UNDER THE TONGUE AND ALLOW TO DISSOLVE  AS NEEDED FOR DIARRHEA AND GAS PAINS TABLET SUBLINGUAL THREE TIMES A DAY
Qty: 60 TABLET | Refills: 6 | Status: ACTIVE | COMMUNITY

## 2022-08-19 RX ORDER — HYOSCYAMINE SULFATE 0.12 MG/1
1 TABLET UNDER THE TONGUE AND ALLOW TO DISSOLVE  AS NEEDED FOR DIARRHEA TABLET SUBLINGUAL THREE TIMES A DAY
Qty: 60 | Refills: 6 | OUTPATIENT
Start: 2022-08-19 | End: 2023-03-17

## 2022-08-19 NOTE — HPI-TODAY'S VISIT:
9/18/2018 The patient is a 58-year-old female with a long, complicated history.  She was diagnosed with SMA syndrome.  She went through a very extensive workup and has had multiple surgeries.  She initially had 1 of the first SMA transposition surgeries done at [place].  Unfortunately, we do not have any of these records today.  Her main issue today is that she has been having problems with bloating, gas, and constipation.  She does not take any medications.  She does not take any pain medications.  She has very limited in her diet.  She has tried eliminating certain foods from her diet.  She has tried probiotics.  She has been evaluated at [facility] and at [facility] as well.  Her last colonoscopy was done at [facility] and she was found to have a right colon polyp.  She had a very tortuous colon at that time.  On her EGD, she was described to have an abnormal shape to the stomach, with shelf looking like it might be on top of the diaphragm or the liver.  The stomach appeared to be in the chest a bit.  Everything on the mucosa appeared normal.  she continues to have daily abdominal pain with bloating.  She also has alternating constipation with diarrhea.  11/26/2019 Ms. Lake present today for f/u of her SMA syndrome with gas, bloating, and consitpation.  She is actually doing well today.  She has been doing PT 3 times a week.  Her Graves disease is under control, and this is the best that she has felt in years.  She has not taken dulcolax or pedialax since our last visit.  She has a small bowel movement every day and a good bowel movement about every 3 days.  She has not done well with Xifaxan in the past.  She did not think that the IB guard helped either.  She has her system fairly stable at this point and she is doing well. 2/11/2020 Ms. Lake presents for evalution of left sided chest wall pain and substernal pain with nausea. The symptoms began acutely last month and have been progressive. Her only relief is positional with laying on her left side on a pillow. The pain may improve with eating but she develops subsequent nausea. She denies any SOB or acute chest pain with any radiation. She denies reflux or heartburn. She went to an urgent care yesterday s/p steroid injection with possible costochondritis with no significant change in her pain. She is taking NSAIDs with no relief. She follows with [facility] and [facility] with history of SMA syndrome s/p transpotision surgery. She follows with Dr. Neves locally. MB 7/21/2020 Ms. Lake presents for follow up of history of SMA syndrome, abdominal pain and constipation. Since her last visit she did see vascular surgery with normal doppler. She continues to have intermittent left chest wall pain and LUQ abdominal pain assocaited with eating and a BM. She is managing this with diet. Protonix and IG gilbert caused significant GI distress with pain and bloating. She does not eat gluten or dairy, she only eats vegetable and legumes. She is not taking anything for her bowels. She was told 3 years ago at [facility] that her GI tract would likely need surgical intervention with in three years due to poor blood flow from her multiple surgeries and that she may require a small bowel transplant. She has not had an EGD/Colonoscopy since. She does not want to pursue this with COVID. She is thinking of trying the hydrotherapy with IV hydration. MB 10/27/2020 Geri presents for follow-up of SMA syndrome, abdominal pain, and weight loss.  Since her last visit she did undergo evaluation with vascular surgery at [facility].  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  She continues to struggle with postprandial abdominal pain and weight loss.  She got down to 92 pounds but is back up to 97 with a more liquid diet.  She is eating a high calorie low residue diet.  Her vascular surgery team is considering a repeat surgery given her vascular changes but she does need a repeat upper endoscopy to evaluate her duodenum and her anatomy.  She continues to struggle with her symptoms but does feel like she has figured out what she can eat.  Today she is ready to proceed with repeat EGD.  MB  11/17/2020 Geri presents for endoscopy follow-up. Dr. Garcia was able to get to the second portion of her duodenum with no evidence of ischemia on the mucosa. She took multiple biopsies in the second portion along with gastric and esophageal biopsies. She has no evidence of ischemia throughout her upper GI tract. She continues to struggle on and off with abdominal pain. Recently it has been more in her right lower quadrant. Her constipation has been fairly well controlled nutritionally. She is not taking anything regularly for this. She does agree that gastropathy plays a role in her symptoms. She would like to have a colonoscopy prior to sitting back down with Dr. Shaw and the vascular team at [facility] early in 2021. Today we had a discussion regarding risks and benefits and she would like to pursue colonoscopy. Her last was in May 2017 done at [facility] with a right colon polyp near the cecum along with a tortuous colon requiring a pediatric scope.  We do not have the pathology report from this.  Today she is doing fairly well otherwise but would like to proceed with repeat colonoscopy for further evaluation of her discomfort.  MB  5/17/2021 Mrs. aLke is presents for follow-up of history of SMA syndrome with a complex history as described above.  Since her last visit she underwent colonoscopy in November 2020 that was normal.  She has been doing well on no medications for her GI tract however 3 weeks ago she developed left flank pain.  She has tried ibuprofen 3 times for the discomfort with some improvement.  This has given her some mild heartburn.  She has a bowel movement on a daily basis by eating beans.  She does feel like she evacuates normally.  The pain is severe, and radiates up and down her entire body.  She called Dr. Hammonds her vascular surgeon at [facility] who suggested she contact her PCP and her gastroenterology team.  She has a complicated history with medications and does not tolerate most any prescription medication.  Her colonoscopy was normal in December as of described above.  Prior to that she had an EGD in October which shows mild esophagitis, and mild gastritis.  She does agree to try a low-dose of Nexium over-the-counter branded as this has helped in the past.  She does agree to try a low-dose of MiraLAX to see if we can improve evacuation.  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  She has not seen a urologist for this, but is interested in with meeting one at [facility].  Today she has multiple complaints and an extremely complicated history.  MB 8/27/2021 The patient presents today for follow-up of her SMA syndrome, abdominal pain, and likely constipation with IBS.  Since her last visit, she was unable to tolerate MiraLAX.  It does trigger migraines.  Almost every medication she takes for constipation tends to trigger migraines including anything with magnesium.  We have discussed dietary changes as well.  Her main complaint is that she does have pain in her left flank and left upper quadrant.  We have discussed that this is likely secondary to constipation with stool and gas.  She also has issues with occasional small bowel spasms.  She does not have a current prescription of high Cosamin.  She has met with her vascular surgeon.  They are watching her left kidney.  We have discussed her seeing a physician for functional medication to try to find some foods that she can take to prevent gas production but also help with her bowels.  I have recommended Dr. Lissette Person.  Overall, her GI symptoms are stable.  She has gained some weight, and she is able to tolerate food.  Her bowels are moving fairly regularly. 2/25/2022 The patient presents today for follow-up of her SMA syndrome, abdominal pain, weight loss, and constipation.  Since her last visit, she is actually been doing quite well.  She has followed up with [facility], and they do want to proceed with possible stenting of her SMA.  Her velocities have increased somewhat, and they do feel that they can help her with stenting at this point.  She is actually feeling quite well.  She is gaining weight.  Her bowels are moving fairly well.  She is able to eat some more protein along with her regular diet.  We have had a long discussion, and I do think proceeding with vascular surgery work-up is likely warranted at this time.  She is going to follow back up with us in 6 months. 8/19/2022 Mrs. Lake presents for follow-up of SMA syndrome, bloating, and constipation.  Since her last visit she is status post mesenteric stent placement and doing significantly better.  She still has some bloating, she does feel like Levsin use in the past helped.  She has eliminated multiple foods with no change.  MiraLAX seems to trigger migraines.  At this point she is having a good bowel movement daily with her coffee.  Today she is doing fairly well otherwise.  MB/KG

## 2023-02-23 NOTE — PHYSICAL EXAM MUSCULOSKELETAL:
normal gait and station , full range of motion [General Appearance - Well Developed] : interactive [General Appearance - Well-Appearing] : well appearing [General Appearance - In No Acute Distress] : in no acute distress [Sclera] : the conjunctiva were normal [Outer Ear] : the ears and nose were normal in appearance [Examination Of The Oral Cavity] : mucous membranes were moist and pink [Normal Appearance] : was normal in appearance [Neck Supple] : was supple [Respiration, Rhythm And Depth] : normal respiratory rhythm and effort [Auscultation Breath Sounds / Voice Sounds] : clear bilateral breath sounds [Heart Rate And Rhythm] : heart rate and rhythm were normal [Heart Sounds] : normal S1 and S2 [Bowel Sounds] : normal bowel sounds [Abdomen Soft] : soft [Abdomen Tenderness] : non-tender [Abdominal Distention] : nondistended [Musculoskeletal Exam: Normal Movement Of All Extremities] : normal movements of all extremities [No Visual Abnormalities] : no visible abnormailities [Generalized Lymph Node Enlargement] : no lymphadenopathy [Skin Color & Pigmentation] : normal skin color and pigmentation [] : no significant rash [Skin Lesions] : no skin lesions [Initial Inspection: Infant Active And Alert] : active and alert [Enlarged Diffusely] : was not enlarged [FreeTextEntry1] : 2/6 systolic murmur

## 2023-08-18 ENCOUNTER — OFFICE VISIT (OUTPATIENT)
Dept: URBAN - NONMETROPOLITAN AREA CLINIC 2 | Facility: CLINIC | Age: 63
End: 2023-08-18

## 2023-08-18 ENCOUNTER — OFFICE VISIT (OUTPATIENT)
Dept: URBAN - NONMETROPOLITAN AREA CLINIC 13 | Facility: CLINIC | Age: 63
End: 2023-08-18
Payer: MEDICARE

## 2023-08-18 VITALS
WEIGHT: 107 LBS | BODY MASS INDEX: 20.2 KG/M2 | HEIGHT: 61 IN | SYSTOLIC BLOOD PRESSURE: 127 MMHG | HEART RATE: 69 BPM | DIASTOLIC BLOOD PRESSURE: 76 MMHG

## 2023-08-18 DIAGNOSIS — K59.09 COLONIC CONSTIPATION: ICD-10-CM

## 2023-08-18 DIAGNOSIS — K55.1 SUPERIOR MESENTERIC ARTERY SYNDROME: ICD-10-CM

## 2023-08-18 DIAGNOSIS — R10.84 ABDOMINAL PAIN, GENERALIZED: ICD-10-CM

## 2023-08-18 PROBLEM — 162049009: Status: ACTIVE | Noted: 2021-05-17

## 2023-08-18 PROBLEM — 440630006: Status: ACTIVE | Noted: 2023-08-18

## 2023-08-18 PROCEDURE — 99213 OFFICE O/P EST LOW 20 MIN: CPT

## 2023-08-18 RX ORDER — HYOSCYAMINE SULFATE 0.12 MG/1
1 TABLET UNDER THE TONGUE AND ALLOW TO DISSOLVE  AS NEEDED FOR DIARRHEA AND GAS PAINS TABLET SUBLINGUAL THREE TIMES A DAY
Qty: 60 TABLET | Refills: 6 | Status: ACTIVE | COMMUNITY

## 2023-08-18 RX ORDER — BUTALBITAL, ACETAMINOPHEN, AND CAFFEINE 50; 300; 40 MG/1; MG/1; MG/1
CAPSULE ORAL
Qty: 0 | Refills: 0 | Status: ACTIVE | COMMUNITY
Start: 1900-01-01

## 2023-08-18 NOTE — HPI-TODAY'S VISIT:
9/18/2018 The patient is a 58-year-old female with a long, complicated history.  She was diagnosed with SMA syndrome.  She went through a very extensive workup and has had multiple surgeries.  She initially had 1 of the first SMA transposition surgeries done at [place].  Unfortunately, we do not have any of these records today.  Her main issue today is that she has been having problems with bloating, gas, and constipation.  She does not take any medications.  She does not take any pain medications.  She has very limited in her diet.  She has tried eliminating certain foods from her diet.  She has tried probiotics.  She has been evaluated at [place] and at [facility] as well.  Her last colonoscopy was done at [facility] and she was found to have a right colon polyp.  She had a very tortuous colon at that time.  On her EGD, she was described to have an abnormal shape to the stomach, with shelf looking like it might be on top of the diaphragm or the liver.  The stomach appeared to be in the chest a bit.  Everything on the mucosa appeared normal.  she continues to have daily abdominal pain with bloating.  She also has alternating constipation with diarrhea.  11/26/2019 Ms. Lake present today for f/u of her SMA syndrome with gas, bloating, and consitpation.  She is actually doing well today.  She has been doing PT 3 times a week.  Her Graves disease is under control, and this is the best that she has felt in years.  She has not taken dulcolax or pedialax since our last visit.  She has a small bowel movement every day and a good bowel movement about every 3 days.  She has not done well with Xifaxan in the past.  She did not think that the IB guard helped either.  She has her system fairly stable at this point and she is doing well. 2/11/2020 Ms. Lake presents for evalution of left sided chest wall pain and substernal pain with nausea. The symptoms began acutely last month and have been progressive. Her only relief is positional with laying on her left side on a pillow. The pain may improve with eating but she develops subsequent nausea. She denies any SOB or acute chest pain with any radiation. She denies reflux or heartburn. She went to an urgent care yesterday s/p steroid injection with possible costochondritis with no significant change in her pain. She is taking NSAIDs with no relief. She follows with [place] and [facility] with history of SMA syndrome s/p transpotision surgery. She follows with Dr. Neves locally. MB 7/21/2020 Ms. Lake presents for follow up of history of SMA syndrome, abdominal pain and constipation. Since her last visit she did see vascular surgery with normal doppler. She continues to have intermittent left chest wall pain and LUQ abdominal pain assocaited with eating and a BM. She is managing this with diet. Protonix and IG gilbert caused significant GI distress with pain and bloating. She does not eat gluten or dairy, she only eats vegetable and legumes. She is not taking anything for her bowels. She was told 3 years ago at [facility] that her GI tract would likely need surgical intervention with in three years due to poor blood flow from her multiple surgeries and that she may require a small bowel transplant. She has not had an EGD/Colonoscopy since. She does not want to pursue this with COVID. She is thinking of trying the hydrotherapy with IV hydration. MB 10/27/2020 Geri presents for follow-up of SMA syndrome, abdominal pain, and weight loss.  Since her last visit she did undergo evaluation with vascular surgery at [place].  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  She continues to struggle with postprandial abdominal pain and weight loss.  She got down to 92 pounds but is back up to 97 with a more liquid diet.  She is eating a high calorie low residue diet.  Her vascular surgery team is considering a repeat surgery given her vascular changes but she does need a repeat upper endoscopy to evaluate her duodenum and her anatomy.  She continues to struggle with her symptoms but does feel like she has figured out what she can eat.  Today she is ready to proceed with repeat EGD.  MB  11/17/2020 Geri presents for endoscopy follow-up. Dr. Garcia was able to get to the second portion of her duodenum with no evidence of ischemia on the mucosa. She took multiple biopsies in the second portion along with gastric and esophageal biopsies. She has no evidence of ischemia throughout her upper GI tract. She continues to struggle on and off with abdominal pain. Recently it has been more in her right lower quadrant. Her constipation has been fairly well controlled nutritionally. She is not taking anything regularly for this. She does agree that gastropathy plays a role in her symptoms. She would like to have a colonoscopy prior to sitting back down with Dr. Shaw and the vascular team at [place] early in 2021. Today we had a discussion regarding risks and benefits and she would like to pursue colonoscopy. Her last was in May 2017 done at [facility] with a right colon polyp near the cecum along with a tortuous colon requiring a pediatric scope.  We do not have the pathology report from this.  Today she is doing fairly well otherwise but would like to proceed with repeat colonoscopy for further evaluation of her discomfort.  MB  5/17/2021 Mrs. Lake is presents for follow-up of history of SMA syndrome with a complex history as described above.  Since her last visit she underwent colonoscopy in November 2020 that was normal.  She has been doing well on no medications for her GI tract however 3 weeks ago she developed left flank pain.  She has tried ibuprofen 3 times for the discomfort with some improvement.  This has given her some mild heartburn.  She has a bowel movement on a daily basis by eating beans.  She does feel like she evacuates normally.  The pain is severe, and radiates up and down her entire body.  She called Dr. Hammonds her vascular surgeon at [place] who suggested she contact her PCP and her gastroenterology team.  She has a complicated history with medications and does not tolerate most any prescription medication.  Her colonoscopy was normal in December as of described above.  Prior to that she had an EGD in October which shows mild esophagitis, and mild gastritis.  She does agree to try a low-dose of Nexium over-the-counter branded as this has helped in the past.  She does agree to try a low-dose of MiraLAX to see if we can improve evacuation.  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  She has not seen a urologist for this, but is interested in with meeting one at [place].  Today she has multiple complaints and an extremely complicated history.  MB 8/27/2021 The patient presents today for follow-up of her SMA syndrome, abdominal pain, and likely constipation with IBS.  Since her last visit, she was unable to tolerate MiraLAX.  It does trigger migraines.  Almost every medication she takes for constipation tends to trigger migraines including anything with magnesium.  We have discussed dietary changes as well.  Her main complaint is that she does have pain in her left flank and left upper quadrant.  We have discussed that this is likely secondary to constipation with stool and gas.  She also has issues with occasional small bowel spasms.  She does not have a current prescription of high Cosamin.  She has met with her vascular surgeon.  They are watching her left kidney.  We have discussed her seeing a physician for functional medication to try to find some foods that she can take to prevent gas production but also help with her bowels.  I have recommended Dr. Lissette Person.  Overall, her GI symptoms are stable.  She has gained some weight, and she is able to tolerate food.  Her bowels are moving fairly regularly. 2/25/2022 The patient presents today for follow-up of her SMA syndrome, abdominal pain, weight loss, and constipation.  Since her last visit, she is actually been doing quite well.  She has followed up with [place], and they do want to proceed with possible stenting of her SMA.  Her velocities have increased somewhat, and they do feel that they can help her with stenting at this point.  She is actually feeling quite well.  She is gaining weight.  Her bowels are moving fairly well.  She is able to eat some more protein along with her regular diet.  We have had a long discussion, and I do think proceeding with vascular surgery work-up is likely warranted at this time.  She is going to follow back up with us in 6 months. 8/19/2022 Mrs. Lake presents for follow-up of SMA syndrome, bloating, and constipation.  Since her last visit she is status post mesenteric stent placement and doing significantly better.  She still has some bloating, she does feel like Levsin use in the past helped.  She has eliminated multiple foods with no change.  MiraLAX seems to trigger migraines.  At this point she is having a good bowel movement daily with her coffee.  Today she is doing fairly well otherwise.  MB/KG  8/18/2023 Mrs. Lake returns to clinic for follow up with abdominal pain and constipation. She is overall much improved . She is allergic to peppermint, intolerant of any colon anti-spasmodic for pain . She has tried Linzess in the past with failed results.  Pain has improved after her mesenteric stent however she has occassional episodes. Feels she needs to change positions and move her body around as the pressure of the stool in her lower quadrants is so painful. Today we discussed trial of new IBS-C med  Ibsrela for the pain component as well as hip bridge elevations. 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 She would like to establish in [place] with Vascular and primary. SP

## 2024-02-16 ENCOUNTER — OV EP (OUTPATIENT)
Dept: URBAN - NONMETROPOLITAN AREA CLINIC 13 | Facility: CLINIC | Age: 64
End: 2024-02-16

## 2024-02-16 VITALS
BODY MASS INDEX: 20.01 KG/M2 | SYSTOLIC BLOOD PRESSURE: 128 MMHG | HEART RATE: 68 BPM | HEIGHT: 61 IN | WEIGHT: 106 LBS | DIASTOLIC BLOOD PRESSURE: 82 MMHG

## 2024-02-16 PROBLEM — 59037007: Status: ACTIVE | Noted: 2024-02-16

## 2024-02-16 RX ORDER — HYOSCYAMINE SULFATE 0.12 MG/1
1 TABLET UNDER THE TONGUE AND ALLOW TO DISSOLVE  AS NEEDED FOR DIARRHEA AND GAS PAINS TABLET SUBLINGUAL THREE TIMES A DAY
Qty: 60 TABLET | Refills: 6 | Status: ACTIVE | COMMUNITY

## 2024-02-16 RX ORDER — BUTALBITAL, ACETAMINOPHEN, AND CAFFEINE 50; 300; 40 MG/1; MG/1; MG/1
CAPSULE ORAL
Qty: 0 | Refills: 0 | Status: ACTIVE | COMMUNITY
Start: 1900-01-01

## 2024-02-16 NOTE — HPI-TODAY'S VISIT:
9/18/2018 The patient is a 58-year-old female with a long, complicated history.  She was diagnosed with SMA syndrome.  She went through a very extensive workup and has had multiple surgeries.  She initially had 1 of the first SMA transposition surgeries done at [place].  Unfortunately, we do not have any of these records today.  Her main issue today is that she has been having problems with bloating, gas, and constipation.  She does not take any medications.  She does not take any pain medications.  She has very limited in her diet.  She has tried eliminating certain foods from her diet.  She has tried probiotics.  She has been evaluated at [facility] and at [facility] as well.  Her last colonoscopy was done at [facility] and she was found to have a right colon polyp.  She had a very tortuous colon at that time.  On her EGD, she was described to have an abnormal shape to the stomach, with shelf looking like it might be on top of the diaphragm or the liver.  The stomach appeared to be in the chest a bit.  Everything on the mucosa appeared normal.  she continues to have daily abdominal pain with bloating.  She also has alternating constipation with diarrhea.  11/26/2019 Ms. Lake present today for f/u of her SMA syndrome with gas, bloating, and consitpation.  She is actually doing well today.  She has been doing PT 3 times a week.  Her Graves disease is under control, and this is the best that she has felt in years.  She has not taken dulcolax or pedialax since our last visit.  She has a small bowel movement every day and a good bowel movement about every 3 days.  She has not done well with Xifaxan in the past.  She did not think that the IB guard helped either.  She has her system fairly stable at this point and she is doing well. 2/11/2020 Ms. Lake presents for evalution of left sided chest wall pain and substernal pain with nausea. The symptoms began acutely last month and have been progressive. Her only relief is positional with laying on her left side on a pillow. The pain may improve with eating but she develops subsequent nausea. She denies any SOB or acute chest pain with any radiation. She denies reflux or heartburn. She went to an urgent care yesterday s/p steroid injection with possible costochondritis with no significant change in her pain. She is taking NSAIDs with no relief. She follows with [facility] and [facility] with history of SMA syndrome s/p transpotision surgery. She follows with Dr. Neves locally. MB 7/21/2020 Ms. Lake presents for follow up of history of SMA syndrome, abdominal pain and constipation. Since her last visit she did see vascular surgery with normal doppler. She continues to have intermittent left chest wall pain and LUQ abdominal pain assocaited with eating and a BM. She is managing this with diet. Protonix and IG gilbert caused significant GI distress with pain and bloating. She does not eat gluten or dairy, she only eats vegetable and legumes. She is not taking anything for her bowels. She was told 3 years ago at [facility] that her GI tract would likely need surgical intervention with in three years due to poor blood flow from her multiple surgeries and that she may require a small bowel transplant. She has not had an EGD/Colonoscopy since. She does not want to pursue this with COVID. She is thinking of trying the hydrotherapy with IV hydration. MB 10/27/2020 Geir presents for follow-up of SMA syndrome, abdominal pain, and weight loss.  Since her last visit she did undergo evaluation with vascular surgery at [facility].  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  She continues to struggle with postprandial abdominal pain and weight loss.  She got down to 92 pounds but is back up to 97 with a more liquid diet.  She is eating a high calorie low residue diet.  Her vascular surgery team is considering a repeat surgery given her vascular changes but she does need a repeat upper endoscopy to evaluate her duodenum and her anatomy.  She continues to struggle with her symptoms but does feel like she has figured out what she can eat.  Today she is ready to proceed with repeat EGD.  MB  11/17/2020 Geri presents for endoscopy follow-up. Dr. Garcia was able to get to the second portion of her duodenum with no evidence of ischemia on the mucosa. She took multiple biopsies in the second portion along with gastric and esophageal biopsies. She has no evidence of ischemia throughout her upper GI tract. She continues to struggle on and off with abdominal pain. Recently it has been more in her right lower quadrant. Her constipation has been fairly well controlled nutritionally. She is not taking anything regularly for this. She does agree that gastropathy plays a role in her symptoms. She would like to have a colonoscopy prior to sitting back down with Dr. Shaw and the vascular team at [facility] early in 2021. Today we had a discussion regarding risks and benefits and she would like to pursue colonoscopy. Her last was in May 2017 done at [facility] with a right colon polyp near the cecum along with a tortuous colon requiring a pediatric scope.  We do not have the pathology report from this.  Today she is doing fairly well otherwise but would like to proceed with repeat colonoscopy for further evaluation of her discomfort.  MB  5/17/2021 Mrs. Lake is presents for follow-up of history of SMA syndrome with a complex history as described above.  Since her last visit she underwent colonoscopy in November 2020 that was normal.  She has been doing well on no medications for her GI tract however 3 weeks ago she developed left flank pain.  She has tried ibuprofen 3 times for the discomfort with some improvement.  This has given her some mild heartburn.  She has a bowel movement on a daily basis by eating beans.  She does feel like she evacuates normally.  The pain is severe, and radiates up and down her entire body.  She called Dr. Hammonds her vascular surgeon at [facility] who suggested she contact her PCP and her gastroenterology team.  She has a complicated history with medications and does not tolerate most any prescription medication.  Her colonoscopy was normal in December as of described above.  Prior to that she had an EGD in October which shows mild esophagitis, and mild gastritis.  She does agree to try a low-dose of Nexium over-the-counter branded as this has helped in the past.  She does agree to try a low-dose of MiraLAX to see if we can improve evacuation.  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  She has not seen a urologist for this, but is interested in with meeting one at [facility].  Today she has multiple complaints and an extremely complicated history.  MB 8/27/2021 The patient presents today for follow-up of her SMA syndrome, abdominal pain, and likely constipation with IBS.  Since her last visit, she was unable to tolerate MiraLAX.  It does trigger migraines.  Almost every medication she takes for constipation tends to trigger migraines including anything with magnesium.  We have discussed dietary changes as well.  Her main complaint is that she does have pain in her left flank and left upper quadrant.  We have discussed that this is likely secondary to constipation with stool and gas.  She also has issues with occasional small bowel spasms.  She does not have a current prescription of high Cosamin.  She has met with her vascular surgeon.  They are watching her left kidney.  We have discussed her seeing a physician for functional medication to try to find some foods that she can take to prevent gas production but also help with her bowels.  I have recommended Dr. Lissette Person.  Overall, her GI symptoms are stable.  She has gained some weight, and she is able to tolerate food.  Her bowels are moving fairly regularly. 2/25/2022 The patient presents today for follow-up of her SMA syndrome, abdominal pain, weight loss, and constipation.  Since her last visit, she is actually been doing quite well.  She has followed up with [facility], and they do want to proceed with possible stenting of her SMA.  Her velocities have increased somewhat, and they do feel that they can help her with stenting at this point.  She is actually feeling quite well.  She is gaining weight.  Her bowels are moving fairly well.  She is able to eat some more protein along with her regular diet.  We have had a long discussion, and I do think proceeding with vascular surgery work-up is likely warranted at this time.  She is going to follow back up with us in 6 months. 8/19/2022 Mrs. Lake presents for follow-up of SMA syndrome, bloating, and constipation.  Since her last visit she is status post mesenteric stent placement and doing significantly better.  She still has some bloating, she does feel like Levsin use in the past helped.  She has eliminated multiple foods with no change.  MiraLAX seems to trigger migraines.  At this point she is having a good bowel movement daily with her coffee.  Today she is doing fairly well otherwise.  MB/KG  8/18/2023 Mrs. Lake returns to clinic for follow up with abdominal pain and constipation. She is overall much improved . She is allergic to peppermint, intolerant of any colon anti-spasmodic for pain . She has tried Linzess in the past with failed results.  Pain has improved after her mesenteric stent however she has occassional episodes. Feels she needs to change positions and move her body around as the pressure of the stool in her lower quadrants is so painful. Today we discussed trial of new IBS-C med  Ibsrela for the pain component as well as hip bridge elevations. 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 She would like to establish in [place] with Vascular and primary. SP

## 2025-02-21 ENCOUNTER — OFFICE VISIT (OUTPATIENT)
Dept: URBAN - NONMETROPOLITAN AREA CLINIC 13 | Facility: CLINIC | Age: 65
End: 2025-02-21